# Patient Record
Sex: FEMALE | Race: WHITE | NOT HISPANIC OR LATINO | URBAN - METROPOLITAN AREA
[De-identification: names, ages, dates, MRNs, and addresses within clinical notes are randomized per-mention and may not be internally consistent; named-entity substitution may affect disease eponyms.]

---

## 2021-01-06 ENCOUNTER — E-VISIT (OUTPATIENT)
Dept: URGENT CARE | Facility: URGENT CARE | Age: 36
End: 2021-01-06
Payer: COMMERCIAL

## 2021-01-06 DIAGNOSIS — Z20.822 SUSPECTED COVID-19 VIRUS INFECTION: Primary | ICD-10-CM

## 2021-01-06 DIAGNOSIS — Z20.822 SUSPECTED COVID-19 VIRUS INFECTION: ICD-10-CM

## 2021-01-06 LAB
LABORATORY COMMENT REPORT: NORMAL
SARS-COV-2 RNA RESP QL NAA+PROBE: NEGATIVE
SARS-COV-2 RNA RESP QL NAA+PROBE: NORMAL
SPECIMEN SOURCE: NORMAL
SPECIMEN SOURCE: NORMAL

## 2021-01-06 PROCEDURE — U0005 INFEC AGEN DETEC AMPLI PROBE: HCPCS | Mod: 90 | Performed by: PATHOLOGY

## 2021-01-06 PROCEDURE — 99421 OL DIG E/M SVC 5-10 MIN: CPT | Performed by: PREVENTIVE MEDICINE

## 2021-01-06 PROCEDURE — U0003 INFECTIOUS AGENT DETECTION BY NUCLEIC ACID (DNA OR RNA); SEVERE ACUTE RESPIRATORY SYNDROME CORONAVIRUS 2 (SARS-COV-2) (CORONAVIRUS DISEASE [COVID-19]), AMPLIFIED PROBE TECHNIQUE, MAKING USE OF HIGH THROUGHPUT TECHNOLOGIES AS DESCRIBED BY CMS-2020-01-R: HCPCS | Mod: 90 | Performed by: PATHOLOGY

## 2021-01-06 NOTE — PATIENT INSTRUCTIONS
Dear Rosario Diaz,    Your symptoms show that you may have coronavirus (COVID-19). This illness can cause fever, cough and trouble breathing. Many people get a mild case and get better on their own. Some people can get very sick.    Will I be tested for COVID-19?  We would like to test you for Covid-19 virus. I have placed orders for this test.     For all employees or close contacts (except [facility] and [facility] - see below), go to your [employer] home page and scroll down to the section that says  You have an appointment that needs to be scheduled  and click the large green button that says  Schedule Now  and follow the steps to find the next available opening.     If you are unable to complete these steps or if you cannot find any available times, please call [phone] to schedule employee testing.     [facility] employees or close contacts, please call [phone].   [facility] ([facility]) employees or close contacts call [phone].    Return to work/school/ guidance:  Please let your workplace manager and staffing office know when your quarantine ends     We can t give you an exact date as it depends on the above. You can calculate this on your own or work with your manager/staffing office to calculate this. (For example if you were exposed on 10/4, you would have to quarantine for 14 full days. That would be through 10/18. You could return on 10/19.)      If you receive a positive COVID-19 test result, follow the guidance of the those who are giving you the results. Usually the return to work is 10 (or in some cases 20 days from symptom onset.) If you work at [employer] [facility], you must also be cleared by Employee Occupational Health and Safety to return to work.        If you receive a negative COVID-19 test result and did not have a high risk exposure to someone with a known positive COVID-19 test, you can return to work once you're free of fever for 24 hours without fever-reducing medication and  your symptoms are improving or resolved.      If you receive a negative COVID-19 test and If you had a high risk exposure to someone who has tested positive for COVID-19 then you can return to work 14 days after your last contact with the positive individual    Note: If you have ongoing exposure to the covid positive person, this quarantine period may be more than 14 days. (For example, if you are continued to be exposed to your child who tested positive and cannot isolate from them, then the quarantine of 7-14 days can't start until your child is no longer contagious. This is typically 10 days from onset of the child's symptoms. So the total duration may be 17-24 days in this case.)    Sign up for [employer].   We know it's scary to hear that you might have COVID-19. We want to track your symptoms to make sure you're okay over the next 2 weeks. Please look for an email from [employer]--this is a free, online program that we'll use to keep in touch. To sign up, follow the link in the email you will receive. Learn more at [email or web address]    How can I take care of myself?    Get lots of rest. Drink extra fluids (unless a doctor has told you not to)    Take Tylenol (acetaminophen) or ibuprofen for fever or pain. If you have liver or kidney problems, ask your family doctor if it's okay to take Tylenol o ibuprofen    If you have other health problems (like cancer, heart failure, an organ transplant or severe kidney disease): Call your specialty clinic if you don't feel better in the next 2 days.    Know when to call 911. Emergency warning signs include:  o Trouble breathing or shortness of breath  o Pain or pressure in the chest that doesn't go away  o Feeling confused like you haven't felt before, or not being able to wake up  o Bluish-colored lips or face    Where can I get more information?   [employer] [facility] [place] - About COVID-19:   [email or web address]    CDC - What to Do If You're Sick:    www.cdc.gov/coronavirus/2019-ncov/about/steps-when-sick.html

## 2021-01-15 ENCOUNTER — HEALTH MAINTENANCE LETTER (OUTPATIENT)
Age: 36
End: 2021-01-15

## 2021-09-18 ENCOUNTER — HEALTH MAINTENANCE LETTER (OUTPATIENT)
Age: 36
End: 2021-09-18

## 2022-03-05 ENCOUNTER — HEALTH MAINTENANCE LETTER (OUTPATIENT)
Age: 37
End: 2022-03-05

## 2022-03-20 ENCOUNTER — OFFICE VISIT (OUTPATIENT)
Dept: URGENT CARE | Facility: URGENT CARE | Age: 37
End: 2022-03-20
Payer: COMMERCIAL

## 2022-03-20 ENCOUNTER — ANCILLARY PROCEDURE (OUTPATIENT)
Dept: GENERAL RADIOLOGY | Facility: CLINIC | Age: 37
End: 2022-03-20
Attending: STUDENT IN AN ORGANIZED HEALTH CARE EDUCATION/TRAINING PROGRAM
Payer: COMMERCIAL

## 2022-03-20 VITALS
WEIGHT: 163.8 LBS | BODY MASS INDEX: 26.64 KG/M2 | TEMPERATURE: 99.6 F | DIASTOLIC BLOOD PRESSURE: 101 MMHG | RESPIRATION RATE: 16 BRPM | HEART RATE: 115 BPM | OXYGEN SATURATION: 97 % | SYSTOLIC BLOOD PRESSURE: 145 MMHG

## 2022-03-20 DIAGNOSIS — S01.511A LIP LACERATION, INITIAL ENCOUNTER: ICD-10-CM

## 2022-03-20 DIAGNOSIS — S09.92XA INJURY OF NOSE, INITIAL ENCOUNTER: ICD-10-CM

## 2022-03-20 DIAGNOSIS — S09.92XA INJURY OF NOSE, INITIAL ENCOUNTER: Primary | ICD-10-CM

## 2022-03-20 DIAGNOSIS — R55 SYNCOPE, UNSPECIFIED SYNCOPE TYPE: ICD-10-CM

## 2022-03-20 PROCEDURE — 99214 OFFICE O/P EST MOD 30 MIN: CPT | Mod: 25 | Performed by: STUDENT IN AN ORGANIZED HEALTH CARE EDUCATION/TRAINING PROGRAM

## 2022-03-20 PROCEDURE — 70160 X-RAY EXAM OF NASAL BONES: CPT | Performed by: RADIOLOGY

## 2022-03-20 PROCEDURE — 12011 RPR F/E/E/N/L/M 2.5 CM/<: CPT | Performed by: STUDENT IN AN ORGANIZED HEALTH CARE EDUCATION/TRAINING PROGRAM

## 2022-03-20 RX ORDER — AMOXICILLIN 500 MG/1
500 CAPSULE ORAL 2 TIMES DAILY
Qty: 10 CAPSULE | Refills: 0 | Status: SHIPPED | OUTPATIENT
Start: 2022-03-20 | End: 2022-03-25

## 2022-03-20 NOTE — PROGRESS NOTES
Assessment & Plan     Injury of nose, initial encounter  She hit her nose on the floor and has bruising and swelling about the bridge of her nose.  I am concerned about possible nasal fracture and got an x-ray which is still pending radiology review.  Will advise on further treatments if needed based on x-ray report.  For now I advised putting ice on the bridge of her nose to help with the swelling and pain.  - XR Nasal Bones 3 Views    Lip laceration, initial encounter  Laceration is midline of lower lip and zig-zagged with flap of skin at the center that remains intact enough to salvage with suturing. Patient verbalized agreement to close laceration with sutures. Started with doing a mental anesthesia block on the left with 2.5 ml of lidocaine without epi. And did not result in anesthesia to the middle lip so I cleansed the skin with alcohol swab prior to injection lidocaine externally and proceeded to inject 1 mL of lido without epi each lateral to the lip on the both sides and infiltrated the tissues surrounding the laceration which achieved anesthesia.  Proceeded to suture the 2 cm laceration with 5-0 ethilon using sterile technique for a total of 6 sutures securing the flap of skin at the middle with a suture to approximate the zig-zagged edges. I put her on amoxicillin which is recommended empirically for lacerations of the lip that involves the wet mucosa.  Advised to monitor and follow up if any redness, warmth or drainage from the wound occurs. Removal of stitches by nurse in 5 days.   - amoxicillin (AMOXIL) 500 MG capsule  Dispense: 10 capsule; Refill: 0  - Procedure rep, face, genital, hand, ft+5cm/<    Syncope, unspecified syncope type  Patient reports she did not eat supper last night and was likely dehydrated and had low blood sugar causing her to pass out when she got up to go to the bathroom during the night.  Her blood pressure is elevated here in the clinic today though she has pain from the  laceration.  She was on blood pressure medication during her pregnancy.  She is 5 months postpartum.  She has had a blood pressure checked in the clinic and it has been normal so she has not needed to continue on blood pressure medication.  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  Advised her to push fluid intake to increase her blood volume and to eat regularly to stabilize blood sugar.    60 minutes spent on the date of the encounter doing chart review, review of test results, interpretation of tests, patient visit and documentation       No follow-ups on file.    Jacquelyn Snyder, [facility] CARLOSJOSIE Ponce is a 36 year old female who presents to clinic today for the following health issues: patient states she woke up to use the bathroomaround 0400 and thinks she fainted, when she woke up she was laying on the ground next to her bed and her lip was bleeding and her nose was swollen. She states she skipped dinner last night and thinks that is why she fainted.  Chief Complaint   Patient presents with     Lip Laceration     fainted this morning and hit face      HPI    Patient reports she did not eat supper last night and was likely dehydrated and had low blood sugar causing her to pass out when she got up to go to the bathroom during the night.  Her blood pressure is elevated here in the clinic today though she has pain from the laceration.  She was on blood pressure medication during her pregnancy.  She is 5 months postpartum.  She has had a blood pressure checked in the clinic and it has been normal so she has not needed to continue on blood pressure medication.     Laceration    Mechanism of injury: fell after getting up in the night to use the bathroom  History provided by: Patient  Time of injury was 8 hours(s) ago.    This is a non-work related and accidental injury.    Associated  symptoms: Denies numbness, weakness, or loss of function    Last tetanus booster within 10 years: Yes    LACERATION EXAM:   Size of laceration: 2 centimeters  Characteristics of the laceration: jagged, flap-like, swollen and bleeding  Depth of laceration: deep  Tendon function intact: NA  Sensation to light touch intact: Yes  Pulses/capillary refill intact: NA  Foreign body: No    Picture included in patient's chart: no    PROCEDURE NOTE:  Anesthesia: Wound was locally injected with 5 cc's of  Lidocaine 1% plain  Prepped and draped in the usual sterile fashion  Wound irrigated with sterile water  Wound was explored for any foreign bodies and evaluated for tendon, nerve, vessel or joint involvement.    Closure was simple  Laceration was closed with 5 X 5.0 Nylon interrupted sutures  Bandage was applied  Patient tolerated the procedure well                Review of Systems  Constitutional, HEENT, cardiovascular, pulmonary, gi and gu systems are negative, except as otherwise noted.      Objective    BP (!) 145/101 (BP Location: Left arm, Patient Position: Sitting, Cuff Size: Adult Large)   Pulse 115   Temp 99.6  F (37.6  C) (Tympanic)   Resp 16   Wt 74.3 kg (163 lb 12.8 oz)   SpO2 97%   BMI 26.64 kg/m    Physical Exam   GENERAL: healthy, alert and no distress  HENT: Nose has mild edema at bridge and bruising.   RESP: lungs clear to auscultation - no rales, rhonchi or wheezes  CV: regular rate and rhythm, normal S1 S2, no S3 or S4, no murmur, click or rub  MS: no gross musculoskeletal defects noted, no edema  SKIN:  2 cm jagged laceration of midline lower lip extending into the oral mucosa    Xray - nasal - awaiting radiology review

## 2022-03-20 NOTE — PATIENT INSTRUCTIONS
Schedule appointment for RN visit to remove sutures in 5 days (Friday).    Start amoxicillin twice daily for 5 days to prevent infection.    Nasal x-ray today. I will send you a My Chart message with the report later today.    Apply ice to the nose to help with the swelling.    Ibuprofen 600-800 mg up to every 8 hours for pain.    Monitor for signs of infection including warmth, swelling, redness, drainage and go to urgent care if any of these symptoms.    Virginie Snyder, CNP

## 2022-03-30 ENCOUNTER — TELEPHONE (OUTPATIENT)
Dept: ENDOCRINOLOGY | Facility: CLINIC | Age: 37
End: 2022-03-30
Payer: COMMERCIAL

## 2022-03-30 DIAGNOSIS — J01.90 ACUTE NON-RECURRENT SINUSITIS, UNSPECIFIED LOCATION: Primary | ICD-10-CM

## 2022-03-30 RX ORDER — AZITHROMYCIN 250 MG/1
TABLET, FILM COATED ORAL
Qty: 6 TABLET | Refills: 0 | Status: SHIPPED | OUTPATIENT
Start: 2022-03-30 | End: 2022-04-04

## 2022-03-30 NOTE — TELEPHONE ENCOUNTER
Symptoms of sinus infection. Will send trini. Iliana Mosley CNP  [facility] [place]

## 2022-06-19 ENCOUNTER — OFFICE VISIT (OUTPATIENT)
Dept: URGENT CARE | Facility: URGENT CARE | Age: 37
End: 2022-06-19
Payer: COMMERCIAL

## 2022-06-19 VITALS
OXYGEN SATURATION: 98 % | SYSTOLIC BLOOD PRESSURE: 138 MMHG | HEART RATE: 86 BPM | WEIGHT: 163.4 LBS | DIASTOLIC BLOOD PRESSURE: 90 MMHG | TEMPERATURE: 98.4 F

## 2022-06-19 DIAGNOSIS — I10 HYPERTENSION, UNSPECIFIED TYPE: ICD-10-CM

## 2022-06-19 DIAGNOSIS — H66.001 ACUTE SUPPURATIVE OTITIS MEDIA OF RIGHT EAR WITHOUT SPONTANEOUS RUPTURE OF TYMPANIC MEMBRANE, RECURRENCE NOT SPECIFIED: Primary | ICD-10-CM

## 2022-06-19 DIAGNOSIS — J06.9 VIRAL UPPER RESPIRATORY TRACT INFECTION: ICD-10-CM

## 2022-06-19 PROCEDURE — 99213 OFFICE O/P EST LOW 20 MIN: CPT | Performed by: PHYSICIAN ASSISTANT

## 2022-06-19 RX ORDER — AMOXICILLIN 875 MG
875 TABLET ORAL 2 TIMES DAILY
Qty: 20 TABLET | Refills: 0 | Status: SHIPPED | OUTPATIENT
Start: 2022-06-19 | End: 2022-06-29

## 2022-06-19 ASSESSMENT — ENCOUNTER SYMPTOMS
HEADACHES: 0
ENDOCRINE NEGATIVE: 1
DIZZINESS: 0
PALPITATIONS: 0
WEAKNESS: 0
SHORTNESS OF BREATH: 0
NECK STIFFNESS: 0
ARTHRALGIAS: 0
CHILLS: 0
SORE THROAT: 1
MYALGIAS: 0
DIARRHEA: 0
WOUND: 0
RHINORRHEA: 0
FEVER: 0
JOINT SWELLING: 0
EYES NEGATIVE: 1
RESPIRATORY NEGATIVE: 1
NAUSEA: 0
ALLERGIC/IMMUNOLOGIC NEGATIVE: 1
MUSCULOSKELETAL NEGATIVE: 1
NECK PAIN: 0
COUGH: 0
LIGHT-HEADEDNESS: 0
CARDIOVASCULAR NEGATIVE: 1
BACK PAIN: 0
VOMITING: 0

## 2022-06-19 NOTE — PROGRESS NOTES
Chief Complaint:    Chief Complaint   Patient presents with     Ear Problem     Right ear pain started on Saturday.      Throat Problem     Pt lost her voice on Friday. Hx of bronchitis and tonsillitis          ASSESSMENT    Vital signs reviewed by Blair Agarwal PA-C  BP (!) 151/87 (BP Location: Left arm, Patient Position: Sitting, Cuff Size: Adult Regular)   Pulse 86   Temp 98.4  F (36.9  C) (Tympanic)   Wt 74.1 kg (163 lb 6.4 oz)   SpO2 98%      1. Acute suppurative otitis media of right ear without spontaneous rupture of tympanic membrane, recurrence not specified    2. Viral upper respiratory tract infection    3. Hypertension, unspecified type         PLAN    Rx for Amoxicillin today for R otitis media.  Fluids, vaporizer, acetaminophen, and or ibuprofen for pain.  Patient is hypertensive in clinic today.  Second BP reading was also above goal at 138/90.  Patient instructed to follow up with PCP in the next week for BP recheck.  Sooner if symptoms worsen.  Worrisome symptoms discussed with instructions to go to the ED.  Patient verbalized understanding and agreed with this plan.     LABS:    No results found for any visits on 06/19/22.      Respiratory History  occasional episodes of bronchitis    Current Meds    Current Outpatient Medications:      Acetaminophen (TYLENOL PO), , Disp: , Rfl:      amoxicillin (AMOXIL) 875 MG tablet, Take 1 tablet (875 mg) by mouth 2 times daily for 10 days, Disp: 20 tablet, Rfl: 0     Ibuprofen (ADVIL PO), , Disp: , Rfl:      Multiple Vitamin (MULTIVITAMIN PO), , Disp: , Rfl:     Problem history  There is no problem list on file for this patient.      Allergies  No Known Allergies      SUBJECTIVE    HPI:Rosario Diaz is an 36 year old female who presents for possible ear infection. Symptoms include ear pain bilaterally and sore throat. Onset 2 days, unchanged since that time. Ear history: few episodes of otitis.    Patient is eating and drinking well.  No fever,  diarrhea or vomiting.  No cough, or wheezing.    ROS:    Review of Systems   Constitutional: Negative for chills and fever.   HENT: Positive for ear pain and sore throat. Negative for congestion and rhinorrhea.    Eyes: Negative.    Respiratory: Negative.  Negative for cough and shortness of breath.    Cardiovascular: Negative.  Negative for chest pain and palpitations.   Gastrointestinal: Negative for diarrhea, nausea and vomiting.   Endocrine: Negative.    Genitourinary: Negative.    Musculoskeletal: Negative.  Negative for arthralgias, back pain, joint swelling, myalgias, neck pain and neck stiffness.   Skin: Negative.  Negative for rash and wound.   Allergic/Immunologic: Negative.  Negative for immunocompromised state.   Neurological: Negative for dizziness, weakness, light-headedness and headaches.        Family History   No family history on file.     Social History  Social History     Socioeconomic History     Marital status:      Spouse name: Not on file     Number of children: Not on file     Years of education: Not on file     Highest education level: Not on file   Occupational History     Not on file   Tobacco Use     Smoking status: Never Smoker     Smokeless tobacco: Never Used   Substance and Sexual Activity     Alcohol use: Never     Drug use: Yes     Sexual activity: Yes     Partners: Male     Birth control/protection: Pill   Other Topics Concern     Parent/sibling w/ CABG, MI or angioplasty before 65F 55M? Not Asked   Social History Narrative     Not on file     Social Determinants of Health     Financial Resource Strain: Not on file   Food Insecurity: Not on file   Transportation Needs: Not on file   Physical Activity: Not on file   Stress: Not on file   Social Connections: Not on file   Intimate Partner Violence: Not on file   Housing Stability: Not on file        OBJECTIVE     Physical Exam:     Vital signs reviewed by Blair Agarwal PA-C  BP (!) 151/87 (BP Location: Left arm, Patient  Position: Sitting, Cuff Size: Adult Regular)   Pulse 86   Temp 98.4  F (36.9  C) (Tympanic)   Wt 74.1 kg (163 lb 6.4 oz)   SpO2 98%      Physical Exam:    Physical Exam  Vitals and nursing note reviewed.   Constitutional:       General: She is not in acute distress.     Appearance: She is well-developed. She is not ill-appearing, toxic-appearing or diaphoretic.   HENT:      Head: Normocephalic and atraumatic.      Right Ear: Hearing, ear canal and external ear normal. No drainage, swelling or tenderness. Tympanic membrane is erythematous and bulging. Tympanic membrane is not perforated or retracted.      Left Ear: Hearing, tympanic membrane, ear canal and external ear normal. No drainage, swelling or tenderness. Tympanic membrane is not perforated, erythematous, retracted or bulging.      Nose: Congestion present. No mucosal edema or rhinorrhea.      Right Sinus: No maxillary sinus tenderness or frontal sinus tenderness.      Left Sinus: No maxillary sinus tenderness or frontal sinus tenderness.      Mouth/Throat:      Pharynx: Posterior oropharyngeal erythema present. No pharyngeal swelling, oropharyngeal exudate or uvula swelling.      Tonsils: No tonsillar exudate or tonsillar abscesses. 0 on the right. 0 on the left.   Eyes:      General:         Right eye: No discharge.         Left eye: No discharge.      Pupils: Pupils are equal, round, and reactive to light.   Cardiovascular:      Rate and Rhythm: Normal rate and regular rhythm.      Heart sounds: Normal heart sounds. No murmur heard.    No friction rub. No gallop.   Pulmonary:      Effort: Pulmonary effort is normal. No respiratory distress.      Breath sounds: Normal breath sounds. No decreased breath sounds, wheezing, rhonchi or rales.   Chest:      Chest wall: No tenderness.   Abdominal:      General: Bowel sounds are normal. There is no distension.      Palpations: Abdomen is soft. There is no mass.      Tenderness: There is no abdominal tenderness.  There is no guarding.   Musculoskeletal:      Cervical back: Normal range of motion and neck supple.   Lymphadenopathy:      Head:      Right side of head: No submental, submandibular, tonsillar, preauricular or posterior auricular adenopathy.      Left side of head: No submental, submandibular, tonsillar, preauricular or posterior auricular adenopathy.      Cervical: No cervical adenopathy.      Right cervical: No superficial or posterior cervical adenopathy.     Left cervical: No superficial or posterior cervical adenopathy.   Skin:     General: Skin is warm and dry.      Findings: No rash.   Neurological:      Mental Status: She is alert and oriented to person, place, and time.      Cranial Nerves: No cranial nerve deficit.      Deep Tendon Reflexes: Reflexes are normal and symmetric.   Psychiatric:         Behavior: Behavior normal. Behavior is cooperative.         Thought Content: Thought content normal.         Judgment: Judgment normal.            Blair Agarwal PA-C  6/19/2022, 9:21 AM

## 2022-11-20 ENCOUNTER — HEALTH MAINTENANCE LETTER (OUTPATIENT)
Age: 37
End: 2022-11-20

## 2023-04-15 ENCOUNTER — HEALTH MAINTENANCE LETTER (OUTPATIENT)
Age: 38
End: 2023-04-15

## 2023-04-21 ENCOUNTER — E-VISIT (OUTPATIENT)
Dept: URGENT CARE | Facility: CLINIC | Age: 38
End: 2023-04-21
Payer: COMMERCIAL

## 2023-04-21 DIAGNOSIS — H57.89 EYE DRAINAGE: Primary | ICD-10-CM

## 2023-04-21 PROCEDURE — 99207 PR NON-BILLABLE SERV PER CHARTING: CPT | Performed by: INTERNAL MEDICINE

## 2023-04-21 NOTE — PATIENT INSTRUCTIONS
Dear Rosario Diaz,    We are sorry you are not feeling well. Because of your daughter's young age and based on the responses you provided, it is recommended that you be seen in-person in urgent care so we can better evaluate your symptoms, as an eye infection in this age group may indicate a more serious infection that needs specific treatment.. Please click here to find the nearest urgent care location to you.   You will not be charged for this Visit. Thank you for trusting us with your care.    Torrie Vance MD

## 2024-06-22 ENCOUNTER — HEALTH MAINTENANCE LETTER (OUTPATIENT)
Age: 39
End: 2024-06-22

## 2025-07-12 ENCOUNTER — HEALTH MAINTENANCE LETTER (OUTPATIENT)
Age: 40
End: 2025-07-12